# Patient Record
Sex: FEMALE | NOT HISPANIC OR LATINO | Employment: FULL TIME | URBAN - METROPOLITAN AREA
[De-identification: names, ages, dates, MRNs, and addresses within clinical notes are randomized per-mention and may not be internally consistent; named-entity substitution may affect disease eponyms.]

---

## 2021-02-07 ENCOUNTER — HOSPITAL ENCOUNTER (EMERGENCY)
Facility: HOSPITAL | Age: 51
Discharge: HOME | End: 2021-02-07
Attending: EMERGENCY MEDICINE | Admitting: EMERGENCY MEDICINE

## 2021-02-07 ENCOUNTER — APPOINTMENT (OUTPATIENT)
Dept: GENERAL RADIOLOGY | Facility: HOSPITAL | Age: 51
End: 2021-02-07

## 2021-02-07 ENCOUNTER — APPOINTMENT (OUTPATIENT)
Dept: CT IMAGING | Facility: HOSPITAL | Age: 51
End: 2021-02-07

## 2021-02-07 VITALS
WEIGHT: 186 LBS | OXYGEN SATURATION: 100 % | SYSTOLIC BLOOD PRESSURE: 144 MMHG | HEART RATE: 68 BPM | DIASTOLIC BLOOD PRESSURE: 90 MMHG | RESPIRATION RATE: 18 BRPM | HEIGHT: 66 IN | TEMPERATURE: 98.2 F | BODY MASS INDEX: 29.89 KG/M2

## 2021-02-07 DIAGNOSIS — S50.02XA CONTUSION OF LEFT ELBOW, INITIAL ENCOUNTER: ICD-10-CM

## 2021-02-07 DIAGNOSIS — S16.1XXA STRAIN OF NECK MUSCLE, INITIAL ENCOUNTER: ICD-10-CM
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
## 2021-02-07 DIAGNOSIS — S00.93XA CONTUSION OF HEAD, UNSPECIFIED PART OF HEAD, INITIAL ENCOUNTER: Primary | ICD-10-CM

## 2021-02-07 PROCEDURE — 72125 CT NECK SPINE W/O DYE: CPT

## 2021-02-07 PROCEDURE — 73070 X-RAY EXAM OF ELBOW: CPT

## 2021-02-07 PROCEDURE — 99284 EMERGENCY DEPT VISIT MOD MDM: CPT

## 2021-02-07 PROCEDURE — 70450 CT HEAD/BRAIN W/O DYE: CPT

## 2021-02-07 RX ORDER — IBUPROFEN 800 MG/1
800 TABLET ORAL EVERY 8 HOURS PRN
Qty: 30 TABLET | Refills: 0 | Status: SHIPPED | OUTPATIENT
Start: 2021-02-07

## 2021-02-07 RX ORDER — HYDROCODONE BITARTRATE AND ACETAMINOPHEN 7.5; 325 MG/1; MG/1
1 TABLET ORAL ONCE
Status: COMPLETED | OUTPATIENT
Start: 2021-02-07 | End: 2021-02-07

## 2021-02-07 RX ORDER — CYCLOBENZAPRINE HCL 10 MG
10 TABLET ORAL 3 TIMES DAILY PRN
Qty: 15 TABLET | Refills: 0 | Status: SHIPPED | OUTPATIENT
Start: 2021-02-07

## 2021-02-07 RX ADMIN — HYDROCODONE BITARTRATE AND ACETAMINOPHEN 1 TABLET: 7.5; 325 TABLET ORAL at 14:10

## 2021-02-07 NOTE — ED PROVIDER NOTES
EMERGENCY DEPARTMENT ENCOUNTER    Room Number:  01/01  Date of encounter:  2/7/2021  PCP: Provider, No Known  Historian: Patient      PPE    Patient was placed in face mask in first look. Patient was wearing facemask when I entered the room and throughout our encounter. I wore full protective equipment throughout this patient encounter including a face mask, and gloves. Hand hygiene was performed before donning protective equipment and after removal when leaving the room.        HPI:  Chief Complaint: Fall  A complete HPI/ROS/PMH/PSH/SH/FH are unobtainable due to: Nothing    Context: Nan Paulson is a 50 y.o. 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  Patient presents with c/o moderate, sharp, constant pain to the back of her head status post a slip and fall on the ice prior to arrival.   Patient also complains of left elbow pain.  Patient denies neck pain, back pain, nausea, vomiting, visual changes, any other injuries.  Patient states that she was walking out of flashes after picking up a order for door-when she slipped on the ice.  She states she thinks she may have lost consciousness for a second or 2.  Patient states that nothing makes the symptoms better and movement worsens symptoms.  Patient states she has had a hysterectomy, she has no medical history and takes no medications.        PAST MEDICAL HISTORY  Active Ambulatory Problems     Diagnosis Date Noted   • No Active Ambulatory Problems     Resolved Ambulatory Problems     Diagnosis Date Noted   • No Resolved Ambulatory Problems     No Additional Past Medical History         PAST SURGICAL HISTORY  No past surgical history on file.      FAMILY HISTORY  No family history on file.      SOCIAL HISTORY  Social History     Socioeconomic History   • Marital status: Single     Spouse name: Not on file   • Number of children: Not on file   • Years of education: Not on file   • Highest education level:  Not on file         ALLERGIES  Patient has no known allergies.        REVIEW OF SYSTEMS  Review of Systems     All systems reviewed and negative except for those discussed in HPI.        PHYSICAL EXAM    ED Triage Vitals [02/07/21 1232]   Temp Heart Rate Resp BP SpO2   98.2 °F (36.8 °C) 92 22 140/76 95 %       Physical Exam   HENT:   Head:       Musculoskeletal:      Left forearm: She exhibits tenderness. She exhibits no swelling and no deformity.      Comments: Tenderness to the left elbow, normal range of motion, no abrasion or laceration noted.  2+ radial pulse on the left.  No tenderness to her shoulder, upper arm, forearm or hand.  Soft compartments to the left forearm.     GENERAL: Well appearing, non-toxic appearing, mildly distressed secondary to pain  HENT: normocephalic  EYES: no scleral icterus, PERRL, EOMI  CV: regular rhythm, regular rate, no murmur  RESPIRATORY: normal effort, CTAB  ABDOMEN: soft   MUSCULOSKELETAL:   No cervical, thoracic or lumbar vertebral tenderness to palpation  No step off or crepitus noted  No cervical, thoracic or lumbar paraspinal tenderness to palpation  NEURO: alert, moves all extremities, follows commands, mental status normal/baseline, bilateral equal handgrips  SKIN: warm, dry, no rash   Psych: Appropriate mood and affect  Nursing notes and vital signs reviewed      LAB RESULTS  No results found for this or any previous visit (from the past 24 hour(s)).    Ordered the above labs and independently reviewed the results.      RADIOLOGY  Xr Elbow 2 View Left    Result Date: 2/7/2021  XR ELBOW 2 VW LEFT-  2/7/2021  HISTORY: Fell, left elbow pain.  No acute bone joint or soft tissue abnormalities are seen.  This report was finalized on 2/7/2021 2:57 PM by Dr. Brandon Max M.D.      Ct Head Without Contrast    Result Date: 2/7/2021  CT BRAIN WITHOUT CONTRAST  HISTORY: Fell. Trauma to back of head. Headache.  TECHNIQUE/FINDINGS: The CT scan was performed as an emergency  procedure through the brain without contrast. The ventricles are normal in size and midline. There is no evidence of intracranial hemorrhage or focal lesion or mass effect and the visualized sinuses are clear. The mastoid air cells are clear.  CT CERVICAL SPINE  HISTORY: Fell. Neck pain.  TECHNIQUE/FINDINGS: The CT scan was performed as an emergency procedure through the cervical spine and demonstrates the followin. There is no evidence of acute fracture with particular reference to the odontoid. 2. There are some degenerative changes in the cervical spine, particularly at C5-6. However, there is no central or foraminal encroachment.      Radiation dose reduction techniques were utilized, including automated exposure control and exposure modulation based on body size.  This report was finalized on 2021 4:32 PM by Dr. Saúl Ruggiero M.D.      Ct Cervical Spine Without Contrast    Result Date: 2021  CT BRAIN WITHOUT CONTRAST  HISTORY: Fell. Trauma to back of head. Headache.  TECHNIQUE/FINDINGS: The CT scan was performed as an emergency procedure through the brain without contrast. The ventricles are normal in size and midline. There is no evidence of intracranial hemorrhage or focal lesion or mass effect and the visualized sinuses are clear. The mastoid air cells are clear.  CT CERVICAL SPINE  HISTORY: Fell. Neck pain.  TECHNIQUE/FINDINGS: The CT scan was performed as an emergency procedure through the cervical spine and demonstrates the followin. There is no evidence of acute fracture with particular reference to the odontoid. 2. There are some degenerative changes in the cervical spine, particularly at C5-6. However, there is no central or foraminal encroachment.      Radiation dose reduction techniques were utilized, including automated exposure control and exposure modulation based on body size.  This report was finalized on 2021 4:32 PM by Dr. Saúl Ruggiero M.D.        I ordered the above  noted radiological studies and viewed the images on the PACS system.       MEDICAL RECORD REVIEW  No medical records to review in epic      PROCEDURES    Procedures        DIFFERENTIAL DIAGNOSIS  Differential Diagnosis for head injury include but are not limited to the following:  Cerebral contusion, Concussion ( with or without LOC), Head contusion, Skull fracture, Hematoma, Intracranial Hemorrhage, Laceration, Post Concussion Syndrome, left elbow contusion/fracture        PROGRESS, DATA ANALYSIS, CONSULTS, AND MEDICAL DECISION MAKING        ED Course as of Feb 07 2009   Sun Feb 07, 2021   1404 Patient had a c-collar in place per EMS, upon entering the room patient had removed it herself stating that she was not able to get the ice to the back of her head.  Patient denies neck pain and was nontender on my exam.  Patient has ambulated to the restroom with a steady gait.  Will obtain CT head and left elbow x-rays.    [MS]   1612 Reviewed pt's history and workup with Dr. Sharpe.  After a bedside evaluation, they agree with the plan of care.          [MS]   1612  Discussed with Dr. Ruggiero regarding the CT head and C-spine results which revealed no hemorrhage or fracture seen        [MS]   1620   Imaging:  CT head: Negative acute  CT C-spine: Negative acute  Left Elbow: No fracture    Given history, exam and work-up low suspicious for intracranial hemorrhage, skull fracture, spine fracture or other acute spinal injuries, or extremity fracture.  Updated patient on imaging results.  Patient will be discharged home with strict return to ER precautions and instructions to follow-up with PMD as needed.        [MS]      ED Course User Index  [MS] Lilian Burt APRN     Discussed plan for discharge, as there is no emergent indication for admission. Pt/family is agreeable and understands need for follow up and repeat testing.  Pt is aware that discharge does not mean that nothing is wrong but it indicates no  emergency is present that requires admission and they must continue care with follow-up as given below or physician of their choice.   Patient/Family voiced understanding of above instructions.  Patient discharged in stable condition.    DIAGNOSIS  Final diagnoses:   Contusion of head, unspecified part of head, initial encounter   Strain of neck muscle, initial encounter   Contusion of left elbow, initial encounter   Fall, initial encounter       FOLLOW UP   PATIENT LIAISON [facility] [address]  [phone]  Call in 1 day        RX     Medication List      New Prescriptions    cyclobenzaprine 10 MG tablet  Commonly known as: FLEXERIL  Take 1 tablet by mouth 3 (Three) Times a Day As Needed for Muscle Spasms.     ibuprofen 800 MG tablet  Commonly known as: ADVIL,MOTRIN  Take 1 tablet by mouth Every 8 (Eight) Hours As Needed for Moderate Pain .           Where to Get Your Medications      You can get these medications from any pharmacy    Bring a paper prescription for each of these medications  · cyclobenzaprine 10 MG tablet  · ibuprofen 800 MG tablet         MEDICATIONS GIVEN IN ED    Medications   HYDROcodone-acetaminophen (NORCO) 7.5-325 MG per tablet 1 tablet (1 tablet Oral Given 2/7/21 1410)           COURSE & MEDICAL DECISION MAKING  Any/All labs and Any/All Imaging studies that were ordered were reviewed and are noted above.  Results were reviewed/discussed with the patient and they were also made aware of online access.    Pt also made aware that some labs, such as cultures, will not be resulted during ER visit and follow up with PMD is necessary.        Lilian Burt, APRN  02/07/21 2009

## 2021-02-07 NOTE — ED NOTES
"Patient to er per ems reported she fell today on the ice and hit her head wall.\" reported no loc with this fall, no blood thinners reported. Ems placed c-collar at scene. Patient alert x 4 at base line. Patient c/o head pain. Patient has mask on in triage along with staff. Patient c/o left elbow pain.      Carlos Jason RN  02/07/21 7652    "

## 2021-02-07 NOTE — ED PROVIDER NOTES
Pt presents to the ED c/o  headache, dizziness and neck pain after a fall prior to arrival.  Patient states she was working and slipped on ice, caused herself to fall backwards with a questionable loss of consciousness.  She states when she hit the ground she did have momentarily tingling in her left arm since resolved.  She denies vomiting, back pain or weakness of an arm or leg.     On exam,   Her heart is regular rate and rhythm without murmur.  Her lungs are clear to auscultation bilaterally.  Patient does have mild mid C-spine tenderness.  She has moderate to severe tenderness to her occipital scalp with some mild swelling.  Fracture after movements are intact.  Her  strength is 5 out of 5 bilaterally.  Her hips are nontender to palpation.     Plan: I agree with plan of obtaining a CT of the head neck to rule out injury.      Patient was placed in face mask in first look. Patient was wearing facemask when I entered the room and throughout our encounter. I wore full protective equipment throughout this patient encounter including a face mask, eye shield and gloves. Hand hygiene was performed before donning protective equipment and after removal when leaving the room.       Attestation:  The BA and I have discussed this patient's history, physical exam, and treatment plan.  I have reviewed the documentation and personally had a face to face interaction with the patient. I affirm the documentation and agree with the treatment and plan.  The attached note describes my personal findings.            Darius Sharpe MD  02/07/21 3053

## 2021-02-07 NOTE — DISCHARGE INSTRUCTIONS
Follow up with your primary care doctor within 48-72 hours. Rest.  Medications as ordered, ice to the back of the head and your left elbow for 24 to 48 hours.  Activity as tolerated.  Often individuals develop a headache associated with nausea in the days/hours after a head injury. This is called a concussion and does not warrant a return to the ED UNLESS: you develop significant worsening of pain, profuse vomiting, dizziness, changes in vision, difficulty walking/speaking, weakness or numbness to your extremities.    Community Clinics available for follow up:      [facility]             [address]  [phone]    [facility]  [address]  [phone]    [address]  [phone]    [facility]  [address]  [phone]    [facility]  [address]  [phone]    [facility]  [address]  [phone]    [address]  (off [address])  [phone]    [facility] [address]  [phone]    [facility]  [address]  [phone]    [facility]  [phone]    [facility]  [address]  [phone]    [facility] [place]  [address]  [phone]    [facility]  [address]  [phone]